# Patient Record
Sex: MALE
[De-identification: names, ages, dates, MRNs, and addresses within clinical notes are randomized per-mention and may not be internally consistent; named-entity substitution may affect disease eponyms.]

---

## 2017-03-08 ENCOUNTER — APPOINTMENT (OUTPATIENT)
Dept: PEDIATRIC ORTHOPEDIC SURGERY | Facility: CLINIC | Age: 22
End: 2017-03-08

## 2017-03-08 DIAGNOSIS — M25.512 PAIN IN LEFT SHOULDER: ICD-10-CM

## 2017-03-08 PROBLEM — Z00.00 ENCOUNTER FOR PREVENTIVE HEALTH EXAMINATION: Status: ACTIVE | Noted: 2017-03-08

## 2017-12-24 ENCOUNTER — HOSPITAL ENCOUNTER (EMERGENCY)
Dept: HOSPITAL 74 - FER | Age: 22
Discharge: HOME | End: 2017-12-24
Payer: COMMERCIAL

## 2017-12-24 VITALS — BODY MASS INDEX: 20.9 KG/M2

## 2017-12-24 VITALS — DIASTOLIC BLOOD PRESSURE: 49 MMHG | TEMPERATURE: 98.5 F | SYSTOLIC BLOOD PRESSURE: 99 MMHG | HEART RATE: 80 BPM

## 2017-12-24 DIAGNOSIS — J18.9: Primary | ICD-10-CM

## 2017-12-24 DIAGNOSIS — Z98.890: ICD-10-CM

## 2017-12-24 LAB
ALBUMIN SERPL-MCNC: 3.4 G/DL (ref 3.4–5)
ALP SERPL-CCNC: 88 U/L (ref 45–117)
ALT SERPL-CCNC: 17 U/L (ref 12–78)
ANION GAP SERPL CALC-SCNC: 9 MMOL/L (ref 8–16)
APPEARANCE UR: CLEAR
AST SERPL-CCNC: 12 U/L (ref 15–37)
BASOPHILS # BLD AUTO: 0 # (ref 0.1–1)
BASOPHILS # BLD: 0.1 % (ref 0–2)
BILIRUB SERPL-MCNC: 0.9 MG/DL (ref 0.2–1)
BILIRUB UR STRIP.AUTO-MCNC: NEGATIVE MG/DL
CALCIUM SERPL-MCNC: 8.3 MG/DL (ref 8.5–10.1)
CO2 SERPL-SCNC: 28 MMOL/L (ref 21–32)
COLOR UR: YELLOW
CREAT SERPL-MCNC: 0.8 MG/DL (ref 0.7–1.3)
DEPRECATED RDW RBC AUTO: 12.6 % (ref 11.9–15.9)
EOSINOPHIL # BLD: 0 # (ref 0–4.5)
EOSINOPHIL # BLD: 0.2 % (ref 0–4.5)
GLUCOSE SERPL-MCNC: 87 MG/DL (ref 74–106)
GRAN CASTS URNS QL MICRO: 1 /LPF
KETONES UR QL STRIP: (no result)
LYMPHOCYTES # BLD: 0.4 10*3/UL (ref 8–40)
MCH RBC QN AUTO: 29 PG (ref 25.7–33.7)
MCHC RBC AUTO-ENTMCNC: 33.9 G/DL (ref 32–35.9)
MCV RBC: 85.5 FL (ref 80–96)
MONOCYTES # BLD: 0.8 # (ref 3.8–10.2)
MUCOUS THREADS URNS QL MICRO: (no result)
NEUTROPHILS # BLD: 10.4 # (ref 42.8–82.8)
NEUTROPHILS # BLD: 89.5 % (ref 42.8–82.8)
NITRITE UR QL STRIP: NEGATIVE
PH UR: 5 [PH] (ref 5–8)
PLATELET # BLD AUTO: 218 K/MM3 (ref 134–434)
PMV BLD: 8.8 FL (ref 7.5–11.1)
PROT SERPL-MCNC: 6.8 G/DL (ref 6.4–8.2)
PROT UR QL STRIP: (no result)
PROT UR QL STRIP: NEGATIVE
RBC # UR STRIP: NEGATIVE /UL
RBC #/AREA URNS HPF: 1 /HPF (ref 0–3)
SP GR UR: 1.02 (ref 1–1.03)
UROBILINOGEN UR STRIP-MCNC: NEGATIVE MG/DL (ref 0.2–1)
WBC # BLD AUTO: 11.6 K/MM3 (ref 4–10)
WBC #/AREA URNS HPF: 2 /HPF (ref 3–5)

## 2017-12-24 PROCEDURE — 3E033GC INTRODUCTION OF OTHER THERAPEUTIC SUBSTANCE INTO PERIPHERAL VEIN, PERCUTANEOUS APPROACH: ICD-10-PCS

## 2017-12-24 PROCEDURE — 3E0337Z INTRODUCTION OF ELECTROLYTIC AND WATER BALANCE SUBSTANCE INTO PERIPHERAL VEIN, PERCUTANEOUS APPROACH: ICD-10-PCS

## 2017-12-24 PROCEDURE — 3E0333Z INTRODUCTION OF ANTI-INFLAMMATORY INTO PERIPHERAL VEIN, PERCUTANEOUS APPROACH: ICD-10-PCS

## 2017-12-24 PROCEDURE — 3E03329 INTRODUCTION OF OTHER ANTI-INFECTIVE INTO PERIPHERAL VEIN, PERCUTANEOUS APPROACH: ICD-10-PCS

## 2017-12-24 NOTE — PDOC
History of Present Illness





- General


Chief Complaint: Pain, Acute


Stated Complaint: PAIN AND FEVER POST TONSILECTOMY


Time Seen by Provider: 12/24/17 07:06





- History of Present Illness


Initial Comments: 





12/24/17 07:14


21yo male presents to the ED with his parents from home for eval of fevers to 

102. States fevers started friday. Pt had a tonsillectomy and adenoidectomy on 

thursday at [facility] - by dr. Ten Clemens (ENT). Per the mother, patient has 

been alternating oxycodone and motrin for the fever at home. This AM felt warm, 

mom gave the patient motrin, but then he vomited - nonbilious/nonbloody. Pt 

also c/o coughing - nonproductive. Pt denies abd pain. No diarrhea. No dysuria. 

No rash. No bleeding. No other complaints. No sob. 





PMHx: denies


PSHx: T/A, shoulder labrum tear repair


Allergies: PCN


Home meds: motrin/oxycodone





Past History





- Past Medical History


Allergies/Adverse Reactions: 


 Allergies











Allergy/AdvReac Type Severity Reaction Status Date / Time


 


Penicillins Allergy   Verified 12/24/17 07:10











Home Medications: 


Ambulatory Orders





Levofloxacin [Levaquin] 750 mg PO DAILY #7 tab 12/24/17 


Oxycodone HCl 5 mg PO PRN 12/24/17 








COPD: No


Other medical history: DENIES





- Immunization History


Td Vaccination: Yes





- Suicide/Smoking/Psychosocial Hx


Smoking Status: No


Smoking History: Never smoked


Years of Tobacco Use: 0


Have you smoked in the past 12 months: No


Number of Cigarettes Smoked Daily: 0


Information on smoking cessation initiated: No


Hx Alcohol Use: Yes (OCCAS.)


Drug/Substance Use Hx: No


Substance Use Type: None





**Review of Systems





- Review of Systems


Able to Perform ROS?: Yes


Is the patient limited English proficient: No


Constitutional: Yes: Fever, Malaise.  No: Chills


HEENTM: Yes: Throat Pain.  No: Eye Pain, Blurred Vision, Nose Pain, Nose 

Congestion, Nose Bleeding, Mouth Pain, Difficulty Swallowing, Mouth Swelling


Respiratory: Yes: Cough.  No: Shortness of Breath, Productive cough


Cardiac (ROS): Yes: Palpitations.  No: Chest Pain, Irregular Heart Rate


ABD/GI: Yes: Nausea, Vomiting.  No: Constipated, Diarrhea


: No: Burning, Dysuria


Musculoskeletal: No: Neck Pain


Integumentary: No: Rash


Neurological: No: Headache, Numbness, Paresthesia, Ataxia


All Other Systems: Reviewed and Negative





*Physical Exam





- Vital Signs


 Last Vital Signs











Temp Pulse Resp BP Pulse Ox


 


 99.6 F   120 H  16   118/55   97 


 


 12/24/17 06:49  12/24/17 06:49  12/24/17 06:49  12/24/17 06:49  12/24/17 06:49














- Physical Exam


General Appearance: Yes: Nourished, Appropriately Dressed


HEENT: positive: EOMI, ANCA, Pharyngeal Erythema, Other (eschars/cautery 

changes to posterior pharynx).  negative: Nasal Congestion, Rhinorrhea


Neck: positive: Normal Thyroid, Supple.  negative: Tender, Rigid


Respiratory/Chest: positive: Lungs Clear, Normal Breath Sounds.  negative: 

Chest Tender, Respiratory Distress


Cardiovascular: positive: Regular Rhythm, S1, S2, Tachycardia


Gastrointestinal/Abdominal: positive: Flat, Soft.  negative: Tender, Distended, 

Guarding, Rebound, Tenderness


Lymphatic: negative: Adenopathy, Tenderness


Musculoskeletal: positive: Normal Inspection.  negative: CVA Tenderness


Extremity: positive: Normal Capillary Refill, Normal Inspection, Normal Range 

of Motion


Integumentary: positive: Normal Color, Dry, Warm


Neurologic: positive: CNs II-XII NML intact, Fully Oriented, Alert, Normal Mood/

Affect, Normal Response, Motor Strength 5/5





ED Treatment Course





- LABORATORY


CBC & Chemistry Diagram: 


 12/24/17 07:12





 12/24/17 07:13





Medical Decision Making





- Medical Decision Making





12/24/17 07:21


a/p: 21yo male with post op fever from T/A


-will check labs, cultures, cxr, ua


-will give ivf hydration


-toradol for pain and fever


-zofran and pepcid


-will call ENT from [facility]


12/24/17 08:00


pt with poss early infiltrate to L perihilar region on xray


given coughing and fevers will start abx


cultures sent


family and patient updated and agree with the plan


12/24/17 09:26


only mildly elevated WBC


feeling better


tolerated PO. 


stable for d/c to home





12/24/17 09:30


call placed to Dr. Ten Clemens to update on the status of the patient.


12/24/17 09:41


discussed all imaging results and lab findings with the family and the patient. 

pt stable for d/c to home at this time. discussed in detail all reasons to 

return to the ED and need for follow up. 





*DC/Admit/Observation/Transfer


Diagnosis at time of Disposition: 


 Pneumonia, Postoperative fever








- Discharge Dispostion


Disposition: HOME


Condition at time of disposition: Stable


Admit: No





- Prescriptions


Prescriptions: 


Levofloxacin [Levaquin] 750 mg PO DAILY #7 tab





- Referrals


Referrals: 


Richard Avalos [Primary Care Provider] - 





- Patient Instructions


Printed Discharge Instructions:  DI for Pneumonia -- Adult


Additional Instructions: 


Please take all meds as prescribed. Please make a follow up appointment with 

your PMD. Please return to the ED with any further concerns.





- Post Discharge Activity

## 2018-05-07 ENCOUNTER — APPOINTMENT (OUTPATIENT)
Dept: PEDIATRIC ORTHOPEDIC SURGERY | Facility: CLINIC | Age: 23
End: 2018-05-07
Payer: COMMERCIAL

## 2018-05-07 PROCEDURE — 99213 OFFICE O/P EST LOW 20 MIN: CPT

## 2018-05-25 ENCOUNTER — APPOINTMENT (OUTPATIENT)
Dept: PEDIATRIC ORTHOPEDIC SURGERY | Facility: CLINIC | Age: 23
End: 2018-05-25

## 2018-06-11 ENCOUNTER — APPOINTMENT (OUTPATIENT)
Dept: PEDIATRIC ORTHOPEDIC SURGERY | Facility: CLINIC | Age: 23
End: 2018-06-11
Payer: COMMERCIAL

## 2018-06-11 PROCEDURE — 99213 OFFICE O/P EST LOW 20 MIN: CPT

## 2018-08-13 ENCOUNTER — APPOINTMENT (OUTPATIENT)
Dept: PEDIATRIC ORTHOPEDIC SURGERY | Facility: CLINIC | Age: 23
End: 2018-08-13
Payer: COMMERCIAL

## 2018-08-13 DIAGNOSIS — M25.551 PAIN IN RIGHT HIP: ICD-10-CM

## 2018-08-13 PROCEDURE — 99213 OFFICE O/P EST LOW 20 MIN: CPT
